# Patient Record
Sex: MALE | Race: WHITE | Employment: FULL TIME | URBAN - METROPOLITAN AREA
[De-identification: names, ages, dates, MRNs, and addresses within clinical notes are randomized per-mention and may not be internally consistent; named-entity substitution may affect disease eponyms.]

---

## 2019-07-26 ENCOUNTER — OFFICE VISIT (OUTPATIENT)
Dept: URGENT CARE | Facility: URGENT CARE | Age: 59
End: 2019-07-26
Payer: COMMERCIAL

## 2019-07-26 VITALS
RESPIRATION RATE: 20 BRPM | HEART RATE: 90 BPM | TEMPERATURE: 98.6 F | DIASTOLIC BLOOD PRESSURE: 70 MMHG | WEIGHT: 315 LBS | BODY MASS INDEX: 47.74 KG/M2 | HEIGHT: 68 IN | SYSTOLIC BLOOD PRESSURE: 126 MMHG

## 2019-07-26 DIAGNOSIS — E11.628 TYPE 2 DIABETES MELLITUS WITH OTHER SKIN COMPLICATION, WITH LONG-TERM CURRENT USE OF INSULIN (H): ICD-10-CM

## 2019-07-26 DIAGNOSIS — Z79.4 TYPE 2 DIABETES MELLITUS WITH OTHER SKIN COMPLICATION, WITH LONG-TERM CURRENT USE OF INSULIN (H): ICD-10-CM

## 2019-07-26 DIAGNOSIS — L03.119 CELLULITIS OF LOWER EXTREMITY, UNSPECIFIED LATERALITY: Primary | ICD-10-CM

## 2019-07-26 PROCEDURE — 99203 OFFICE O/P NEW LOW 30 MIN: CPT | Mod: 25 | Performed by: FAMILY MEDICINE

## 2019-07-26 PROCEDURE — 96372 THER/PROPH/DIAG INJ SC/IM: CPT | Performed by: FAMILY MEDICINE

## 2019-07-26 RX ORDER — PROPRANOLOL HYDROCHLORIDE 60 MG/1
TABLET ORAL
Refills: 3 | COMMUNITY
Start: 2019-07-09

## 2019-07-26 RX ORDER — WARFARIN SODIUM 5 MG/1
TABLET ORAL
Refills: 1 | COMMUNITY
Start: 2019-06-28

## 2019-07-26 RX ORDER — SPIRONOLACTONE 25 MG/1
TABLET ORAL
Refills: 3 | COMMUNITY
Start: 2019-07-09

## 2019-07-26 RX ORDER — ATORVASTATIN CALCIUM 40 MG/1
TABLET, FILM COATED ORAL
Refills: 2 | COMMUNITY
Start: 2019-07-09

## 2019-07-26 RX ORDER — INSULIN GLARGINE 100 [IU]/ML
INJECTION, SOLUTION SUBCUTANEOUS
Refills: 3 | COMMUNITY
Start: 2019-05-12

## 2019-07-26 RX ORDER — INSULIN LISPRO 100 [IU]/ML
INJECTION, SOLUTION INTRAVENOUS; SUBCUTANEOUS
Refills: 3 | COMMUNITY
Start: 2019-05-14

## 2019-07-26 RX ORDER — FUROSEMIDE 80 MG
TABLET ORAL
Refills: 0 | COMMUNITY
Start: 2019-05-23

## 2019-07-26 RX ORDER — LISINOPRIL 5 MG/1
TABLET ORAL
Refills: 1 | COMMUNITY
Start: 2019-07-09

## 2019-07-26 RX ORDER — CEPHALEXIN 500 MG/1
500 CAPSULE ORAL 4 TIMES DAILY
Qty: 40 CAPSULE | Refills: 0 | Status: SHIPPED | OUTPATIENT
Start: 2019-07-26 | End: 2019-08-05

## 2019-07-26 RX ORDER — ALBUTEROL SULFATE 90 UG/1
2 AEROSOL, METERED RESPIRATORY (INHALATION)
COMMUNITY
Start: 2010-03-19

## 2019-07-26 RX ORDER — DILTIAZEM HYDROCHLORIDE 180 MG/1
CAPSULE, EXTENDED RELEASE ORAL
Refills: 3 | COMMUNITY
Start: 2019-07-09

## 2019-07-26 RX ORDER — CEFTRIAXONE SODIUM 1 G
1 VIAL (EA) INJECTION ONCE
Status: COMPLETED | OUTPATIENT
Start: 2019-07-26 | End: 2019-07-26

## 2019-07-26 RX ORDER — PAROXETINE 30 MG/1
TABLET, FILM COATED ORAL
Refills: 3 | COMMUNITY
Start: 2019-07-09

## 2019-07-26 RX ORDER — DIGOXIN 250 UG/1
TABLET ORAL
Refills: 3 | COMMUNITY
Start: 2019-01-07

## 2019-07-26 RX ADMIN — Medication 1 G: at 18:10

## 2019-07-26 ASSESSMENT — MIFFLIN-ST. JEOR: SCORE: 2459.4

## 2019-07-26 NOTE — PROGRESS NOTES
"SUBJECTIVE:Candido Henderson is a 59 year old male who presents to the clinic today for a rash.  Onset of rash was day(s) ago.   Rash is still present.   Location of the rash: lower leg.  Associated symptoms include: redness.    Symptoms are moderate and rash seems to be worsening.  Therapies tried to improve the rash: none.  Previous history of a similar rash? No  Recent exposure history: none known    Past Medical History:   Diagnosis Date     Diabetes mellitus, type 2 (H)        No past surgical history on file.    No family history on file.    Social History     Tobacco Use     Smoking status: Never Smoker     Smokeless tobacco: Never Used   Substance Use Topics     Alcohol use: Not on file      No Known Allergies    ROS:CONSTITUTIONAL:NEGATIVE for fever, chills, change in weight    EXAM: VITALS: /70 (Cuff Size: Adult Large)   Pulse 90   Temp 98.6  F (37  C) (Oral)   Resp 20   Ht 1.715 m (5' 7.5\")   Wt (!) 168.3 kg (371 lb)   BMI 57.25 kg/m    General:healthy,alert,no distress    Location: lower leg     Distribution: diffuse/patchy     Lesion grouping: bilateral R>L     Lesion type: macular     Color: red with tenderness      ICD-10-CM    1. Cellulitis of lower extremity, unspecified laterality L03.119 cefTRIAXone (ROCEPHIN) injection 1 g     cephALEXin (KEFLEX) 500 MG capsule     CEFTRIAXONE NA INJ /250MG     INJECTION INTRAMUSCULAR OR SUB-Q   2. Type 2 diabetes mellitus with other skin complication, with long-term current use of insulin (H) E11.628 cefTRIAXone (ROCEPHIN) injection 1 g    Z79.4 cephALEXin (KEFLEX) 500 MG capsule       Follow-up with primary clinic recheck Monday, ED if not improving    "

## 2022-05-01 ENCOUNTER — NURSE TRIAGE (OUTPATIENT)
Dept: NURSING | Facility: CLINIC | Age: 62
End: 2022-05-01
Payer: COMMERCIAL

## 2022-05-01 NOTE — TELEPHONE ENCOUNTER
Red transfer, pt calling    Noticed a day ago that his tongue had started bleeding after brushing his tongue with a toothbrush, bleeding had stopped after a while but then today after eating it started bleeding again     On warfarin     Has been bleeding for about an hour and a half now and he states that he is changing gauze pads every 15 minutes     Does not see an obvious injury to the tongue and the bleeding seems to be by the tip of his tongue    Per protocol, advised to go to the ED     Pt verbalizes understanding and agrees to plan         Reason for Disposition    [1] Bleeding AND [2] won't stop after 10 minutes of direct pressure (using correct technique)    Additional Information    Negative: Cut on surface of TONGUE > 1 inch (24 mm) and that gapes open    Negative: Cut of side or tip of TONGUE that gapes open (split tongue)    Negative: Gaping cut through border of the LIP where it meets the skin  (or length > 1/4 inch or 6 mm)    Negative: Gaping cut of OUTER LIP  (or length > 1/4 inch or 6 mm)    Negative: Main injury is to the teeth    Negative: [1] Major bleeding (e.g., actively dripping or spurting) AND [2] can't be stopped    Negative: Fainted or too weak to stand following large blood loss    Negative: Knocked out (unconscious) > 1 minute    Negative: Difficult to awaken or acting confused (e.g., disoriented, slurred speech)    Negative: Difficulty breathing    Negative: Severe neck pain    Negative: Sounds like a life-threatening emergency to the triager    Protocols used: MOUTH INJURY-A-AH    COVID 19 Nurse Triage Plan/Patient Instructions    Please be aware that novel coronavirus (COVID-19) may be circulating in the community. If you develop symptoms such as fever, cough, or SOB or if you have concerns about the presence of another infection including coronavirus (COVID-19), please contact your health care provider or visit [email or web address].     Disposition/Instructions    ED Visit  recommended. Follow protocol based instructions.     Bring Your Own Device:  Please also bring your smart device(s) (smart phones, tablets, laptops) and their charging cables for your personal use and to communicate with your care team during your visit.    Thank you for taking steps to prevent the spread of this virus.  o Limit your contact with others.  o Wear a simple mask to cover your cough.  o Wash your hands well and often.    Resources    [facility]: About COVID-19: [email or web address]    CDC: What to Do If You're Sick: www.cdc.gov/coronavirus/2019-ncov/about/steps-when-sick.html    CDC: Ending Home Isolation: www.cdc.gov/coronavirus/2019-ncov/hcp/disposition-in-home-patients.html     CDC: Caring for Someone: www.cdc.gov/coronavirus/2019-ncov/if-you-are-sick/care-for-someone.html     [facility]: Interim Guidance for Hospital Discharge to Home: [email or web address]/[email or web address]/hcp/hospdischarge.pdf    [facility] clinical trials (COVID-19 research studies): [email or web address]-trials     Below are the COVID-19 hotlines at the [place] [facility] ([facility]). Interpreters are available.   o For health questions: Call 848-788-0304 or 1-638.877.7099 (7 a.m. to 7 p.m.)  o For questions about schools and childcare: Call 973-475-1985 or 1-902.698.3477 (7 a.m. to 7 p.m.)         Shante Coronado RN [facility] May 1, 2022 5:52 PM